# Patient Record
Sex: FEMALE | Race: OTHER | Employment: OTHER | URBAN - METROPOLITAN AREA
[De-identification: names, ages, dates, MRNs, and addresses within clinical notes are randomized per-mention and may not be internally consistent; named-entity substitution may affect disease eponyms.]

---

## 2017-03-28 NOTE — PATIENT DISCUSSION
Pt edu on OCT/EYE model. As an aging change the pigment layer becomes unhealthy and an abn blood vessels can grown and leak fluid. Hard to tell due to [place] Oil blocking view. Would possibly consider treatment only if [place] oil is removed. But not recc until 6 months after RD repair. Unknown if treating active AMD would even increase VA due to RD. Recc watching for now and f/u in 1 month. Call with any VA changes.

## 2017-04-25 NOTE — PATIENT DISCUSSION
Would possibly consider treatment only if [place] oil is removed. But not recc until 6 months after RD repair. Unknown if treating active AMD would even increase VA due to [facility] OFF RD and center VA damaged. Recc watching for now and f/u in 1 month. Call with any VA changes.

## 2017-04-25 NOTE — PATIENT DISCUSSION
AREDS 2 study discussed in detail. Blanco Jack discussed. Pt will do comparative shopping for both. Sample of [address] give along with [employer] order form. Advised pt to d/c MVI due to overlap.

## 2017-06-27 NOTE — PATIENT DISCUSSION
AREDS 2 study discussed in detail. Alyssa Neither discussed. Pt will do comparative shopping for both. Sample of [address] give along with [employer] order form. Advised pt to d/c MVI due to overlap.

## 2017-06-27 NOTE — PATIENT DISCUSSION
Unknown if treating active AMD would even increase VA due to [facility] OFF RD and center VA damaged. Recc watching for now and f/u in 1 month. Call with any VA changes.

## 2017-06-27 NOTE — PATIENT DISCUSSION
Pt edu the swelling is actually much better than last visit and VA tested better. Has been 6 months after RD repair, prognosis of VA unknown if S.O removed, runs the risk of Re-detachment. Would recc following x 1 more month and then make decision if Oil should be removed.

## 2017-08-02 NOTE — PATIENT DISCUSSION
Possible sx to remove [place] Oil in future but #1 could limit VA BCVA. SEE #, will need to weigh risks vs. benefits because with [place] Oil removal retina CAN re-detach.

## 2017-08-02 NOTE — PATIENT DISCUSSION
Pt edu the swelling is still present.  As discussed last visit option for [place] Oil removal but STRESSED not sure how much VA potential will be due to underlying Wet AMD.

## 2017-08-02 NOTE — PATIENT DISCUSSION
"Pt told SANTINO Bazzi he is not taking correct dose of AREDS 2 and if it ""doesn't help"" he will stop them.  Note left for Dr. Gurpreet Reese to discuss again with pt . the goal of AREDS 2 and proper dose. "

## 2017-08-02 NOTE — PATIENT DISCUSSION
AREDS 2 study discussed in detail at last . [address] [place] [address] vs. [address] discussed. Pt will do comparative shopping for both. Sample of [address] give along with [employer] order form. pt was advised to d/c MVI due to overlap at last visit .

## 2017-10-16 NOTE — PATIENT DISCUSSION
AREDS 2 study discussed in detail at last . [address] [place] [address] vs. [address] discussed. Pt will do comparative shopping for both. Sample of [address] give along with [employer] order form. pt was advised to d/c MVI due to overlap at last visit .

## 2017-10-16 NOTE — PATIENT DISCUSSION
"Pt told SANTINO Bazzi he is not taking correct dose of AREDS 2 and if it ""doesn't help"" he will stop them.  Note left for Dr. Lico Jurado to discuss again with pt . the goal of AREDS 2 and proper dose. "

## 2017-10-16 NOTE — PATIENT DISCUSSION
Pt edu the swelling is still present.  As discussed last visit option for [place] Oil removal but STRESSED not sure how much VA potential will be due to underlying Wet AMD. Also stressed if Edema gets worse only then possibly take out the oil in future but retina may re -detach. Monitor for now.

## 2017-11-20 NOTE — PATIENT DISCUSSION
AREDS 2 study discussed in detail at last . [address] [place] [address] vs. [employer] discussed. Pt will do comparative shopping for both. Sample of [place] give along with [employer] order form. pt was advised to d/c MVI due to overlap at last visit .

## 2017-11-20 NOTE — PATIENT DISCUSSION
"Pt told SANTINO Bazzi he is not taking correct dose of AREDS 2 and if it ""doesn't help"" he will stop them.  Note left for Dr. Emily Georges to discuss again with pt . the goal of AREDS 2 and proper dose. "

## 2017-11-20 NOTE — PATIENT DISCUSSION
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 Should not progress to much, may require surgery to remove oil and then replace it. Will need laser and cryo to help flatten out the retina. STRESSED to patient VA will never be what it was prior to the first retinal detachment. Pt had a TOTAL detachment so vision will never be what it was prior.

## 2017-12-18 NOTE — PATIENT DISCUSSION
STRESSED to patient VA will never be what it was prior to the first retinal detachment. scheduled steff oil exchange 1/11/2018. discussed may need a second sx to remove scar tissue but will evaluate.

## 2018-01-19 NOTE — PATIENT DISCUSSION
Drops: Cyclopentolate twice a day, Prednisolone AC every hour while awake, Moxifloxicin four times a day and also start Medrol Dose pack and take as directed. Ok to take Tylenol for any pain. Alohol.

## 2018-01-19 NOTE — PATIENT DISCUSSION
Pt states that with previous surgery he did NOT do face down positioning. Stressed that is the reason why the surgery did not work in the first place.

## 2018-01-19 NOTE — PATIENT DISCUSSION
STRESSED to patient to try to stop smoking for the week, smoking increases scar tissue. STRESSED could still re-detach due to PVR.

## 2018-01-19 NOTE — PATIENT DISCUSSION
Good PO progress, Post op instructions given to patient. STRESSED NO FLAT ON BACK, face down positioning discussed. STRESSED vision will NEVER be great or as good as it was before all the detachments. Goal is to stabilize the eye and keep it comfortable. Drop instructions given to patient. S/S of RT/RD discussed in detail. 24/7 ER reviewed.

## 2018-02-02 NOTE — PROCEDURE NOTE: CLINICAL
PROCEDURE NOTE: Laser for Lattice Degeneration #1 OS. Diagnosis: S/P PPV. Anesthesia: Topical. Prior to laser, risks/benefits/alternatives to laser discussed including loss of vision, decreased peripheral and night vision, need for more laser and/or surgery and patient wished to proceed. Spot size: 200 um. Power: 400 mW. Number: 48. Patient tolerated procedure well. There were no complications. Post procedure instructions given. Quirino Raymundo

## 2018-02-02 NOTE — PATIENT DISCUSSION
Based off of examination, recommended [place]ade Lattice Laser today. The patient elects to proceed. The laser was administered and tolerated by the patient with no complications. Keep head down position until the end of the day.

## 2018-05-14 NOTE — PATIENT DISCUSSION
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  Patient underwent re-op Jan 2018, now POM 4, retinal is flat, vision has improved to 20/200, no residual PVR Prednisolone to three times a day.

## 2018-09-12 ENCOUNTER — NEW PATIENT COMPREHENSIVE (OUTPATIENT)
Dept: URBAN - METROPOLITAN AREA CLINIC 35 | Facility: CLINIC | Age: 23
End: 2018-09-12

## 2018-09-12 DIAGNOSIS — H52.202: ICD-10-CM

## 2018-09-12 PROCEDURE — 92015 DETERMINE REFRACTIVE STATE: CPT

## 2018-09-12 PROCEDURE — 92004 COMPRE OPH EXAM NEW PT 1/>: CPT

## 2018-09-12 ASSESSMENT — VISUAL ACUITY
OS_SC: J3
OD_SC: J1
OD_SC: 20/20-1
OS_SC: 20/25-1

## 2018-09-12 ASSESSMENT — TONOMETRY
OD_IOP_MMHG: 11
OS_IOP_MMHG: 11